# Patient Record
Sex: MALE | Race: WHITE
[De-identification: names, ages, dates, MRNs, and addresses within clinical notes are randomized per-mention and may not be internally consistent; named-entity substitution may affect disease eponyms.]

---

## 2017-12-11 ENCOUNTER — HOSPITAL ENCOUNTER (EMERGENCY)
Dept: HOSPITAL 25 - ED | Age: 32
Discharge: HOME | End: 2017-12-11
Payer: SELF-PAY

## 2017-12-11 VITALS — SYSTOLIC BLOOD PRESSURE: 116 MMHG | DIASTOLIC BLOOD PRESSURE: 71 MMHG

## 2017-12-11 DIAGNOSIS — S06.0X9A: Primary | ICD-10-CM
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
## 2017-12-11 DIAGNOSIS — G61.0: ICD-10-CM
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
## 2017-12-11 DIAGNOSIS — S16.1XXA: ICD-10-CM

## 2017-12-11 DIAGNOSIS — S00.03XA: ICD-10-CM

## 2017-12-11 DIAGNOSIS — F17.200: ICD-10-CM

## 2017-12-11 PROCEDURE — 72125 CT NECK SPINE W/O DYE: CPT

## 2017-12-11 PROCEDURE — 99282 EMERGENCY DEPT VISIT SF MDM: CPT

## 2017-12-11 PROCEDURE — 70450 CT HEAD/BRAIN W/O DYE: CPT

## 2017-12-11 PROCEDURE — 70486 CT MAXILLOFACIAL W/O DYE: CPT
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
## 2018-04-05 ENCOUNTER — HOSPITAL ENCOUNTER (INPATIENT)
Dept: HOSPITAL 25 - AA | Age: 33
LOS: 1 days | Discharge: HOME | DRG: 23 | End: 2018-04-06
Attending: NEUROLOGICAL SURGERY | Admitting: NEUROLOGICAL SURGERY
Payer: COMMERCIAL

## 2018-04-05 DIAGNOSIS — Z88.7: ICD-10-CM

## 2018-04-05 DIAGNOSIS — F17.210: ICD-10-CM

## 2018-04-05 DIAGNOSIS — M50.122: Primary | ICD-10-CM

## 2018-04-05 DIAGNOSIS — M25.78: ICD-10-CM

## 2018-04-05 PROCEDURE — 76001: CPT

## 2018-04-05 PROCEDURE — 0RG10A0 FUSION OF CERVICAL VERTEBRAL JOINT WITH INTERBODY FUSION DEVICE, ANTERIOR APPROACH, ANTERIOR COLUMN, OPEN APPROACH: ICD-10-PCS | Performed by: NEUROLOGICAL SURGERY

## 2018-04-05 PROCEDURE — 72040 X-RAY EXAM NECK SPINE 2-3 VW: CPT

## 2018-04-05 PROCEDURE — 0RB30ZZ EXCISION OF CERVICAL VERTEBRAL DISC, OPEN APPROACH: ICD-10-PCS | Performed by: NEUROLOGICAL SURGERY

## 2018-04-05 RX ADMIN — HYDROCODONE BITARTRATE AND ACETAMINOPHEN PRN TAB: 5; 325 TABLET ORAL at 16:02

## 2018-04-05 RX ADMIN — FAMOTIDINE SCH MG: 20 TABLET, FILM COATED ORAL at 20:17

## 2018-04-05 RX ADMIN — KETOROLAC TROMETHAMINE SCH MG: 30 INJECTION, SOLUTION INTRAMUSCULAR; INTRAVENOUS at 18:55

## 2018-04-05 RX ADMIN — FENTANYL CITRATE PRN MCG: 0.05 INJECTION, SOLUTION INTRAMUSCULAR; INTRAVENOUS at 11:03

## 2018-04-05 RX ADMIN — OXYCODONE HYDROCHLORIDE AND ACETAMINOPHEN PRN TAB: 5; 325 TABLET ORAL at 20:17

## 2018-04-05 RX ADMIN — FENTANYL CITRATE PRN MCG: 0.05 INJECTION, SOLUTION INTRAMUSCULAR; INTRAVENOUS at 10:48

## 2018-04-05 RX ADMIN — NICOTINE SCH: 21 PATCH TRANSDERMAL at 12:07

## 2018-04-05 RX ADMIN — HYDROCODONE BITARTRATE AND ACETAMINOPHEN PRN TAB: 5; 325 TABLET ORAL at 12:06

## 2018-04-05 RX ADMIN — FENTANYL CITRATE PRN MCG: 0.05 INJECTION, SOLUTION INTRAMUSCULAR; INTRAVENOUS at 10:54

## 2018-04-05 NOTE — RAD
INDICATION: Anterior cervical discectomy and fusion.



COMPARISONS: March 06, 2013



TECHNIQUE: Fluoroscopy was provided for a surgical procedure. Total fluoroscopy time is:

19.7 seconds



FINDINGS: Spot images demonstrate anterior cervical fusion at C5-C6.



IMPRESSION: FLUOROSCOPY WAS PROVIDED FOR A SURGICAL PROCEDURE

## 2018-04-06 VITALS — DIASTOLIC BLOOD PRESSURE: 78 MMHG | SYSTOLIC BLOOD PRESSURE: 143 MMHG

## 2018-04-06 RX ADMIN — OXYCODONE HYDROCHLORIDE AND ACETAMINOPHEN PRN TAB: 5; 325 TABLET ORAL at 08:51

## 2018-04-06 RX ADMIN — KETOROLAC TROMETHAMINE SCH MG: 30 INJECTION, SOLUTION INTRAMUSCULAR; INTRAVENOUS at 03:27

## 2018-04-06 RX ADMIN — FAMOTIDINE SCH MG: 20 TABLET, FILM COATED ORAL at 08:51

## 2018-04-06 RX ADMIN — OXYCODONE HYDROCHLORIDE AND ACETAMINOPHEN PRN TAB: 5; 325 TABLET ORAL at 00:17

## 2018-04-06 RX ADMIN — NICOTINE SCH: 21 PATCH TRANSDERMAL at 08:52

## 2018-04-06 NOTE — PN
Progress Note





- Progress Note


Date of Service: 04/06/18


SOAP: 


Subjective:


[]No events ON. Right UE pain resolved. Mild incisional pain. Tolerates PO 

well. Voids, Ambulates, Cleared per PT per nurse. Wants to go home.








Objective:


[]VSS, Afebrile.


MJ collar


Wound s,c,d.


MITCHEL drain removed. Patient tolerated procedure well, Catheter appeared to be 

intact.


AAOx3, KATIA, CNII-XII grossly intact,


Motor 5/5 ,No drift,


Sensory grossly intact to light touch.








Assessment:


[]32 yom POD#! ACDF C5-6








Plan:


[]XR reveals good placement of hardware.


Encourage ambulation.


DC planning.


Full instructions were given to the patient.





KHUSHBOO Vazquez MD Grossly Intact

## 2018-04-06 NOTE — RAD
HISTORY: Postop spinal fusion



COMPARISONS: March 06, 2015



VIEWS: 2, frontal and lateral views of the cervical spine



FINDINGS: 

The cervical spine is visualized from the skull base through C7.



ALIGNMENT:  The alignment is normal.

VERTEBRAL BODIES:  The patient is status post anterior cervical fusion at C5-C6. There is

no hardware failure or osteolysis.

JOINTS:  There is no subluxation or dislocation. The facet joints are unremarkable.    

INTERVERTEBRAL DISCS:  Intervertebral graft material is noted at C5-C6.

SOFT TISSUE: There is prevertebral soft tissue thickening consistent with the history of

recent surgery. A surgical drain is noted.



OTHER:  The skull base is normal. The lung apices are clear.



IMPRESSION: 

STATUS POST ANTERIOR CERVICAL FUSION.

## 2018-04-06 NOTE — OP
DATE OF SURGERY:  04/05/18 - ROOM #332

 

DATE OF BIRTH:  [birth year]

 

SURGEON:  Breanna Vazquez MD.

 

ASSISTANT:  TANA Wellington.  Procedure was done with assistance of the PA 
because of the complexity of the procedure.

 

ANESTHESIA:  General.

 

PRE-OP DIAGNOSIS:  Herniated nucleus pulposus C5-6.

 

POST-OP DIAGNOSIS:  Herniated nucleus pulposus C5-6.

 

PROCEDURE PERFORMED:  Patient underwent anterior cervical diskectomy and fusion 
at C5-6 with PEEK interbody cage, anterior plate and DBX putty with autologous 
local bone graft.

 

ESTIMATED BLOOD LOSS:  25 cc.

 

COMPLICATIONS:  None.

 

SUMMARY:  The patient is a very pleasant 32-year-old gentleman with complaints 
of neck pain radiating to the right upper extremity.  MRI revealed a large disk 
herniation of the right C5-6.  After failing conservative treatment modalities 
he was offered the option of surgical intervention in the form of anterior 
cervical diskectomy and fusion.  After explaining to the patient expectations, 
limitations, and possible complication of the procedure with complications 
included, but not limited to bleeding, infections, risk of injury to adjacent 
structures, coma, paralysis, death, need for additional procedure, anesthesia 
risks, stroke, blindness, cancer, instability, hardware failure, vocal cord 
paralysis, adjacent level disease, pseudoarthrosis, need for additional 
procedures, need for tracheostomy or gastrostomy, anesthesia risks.  Patient 
was agreeable to proceed with surgery.  Informed consent was obtained.  The 
patient understood that his condition may not improve and in fact may get worse 
after the surgery and he may need additional procedure in the future.  He also 
understood that operative plan may be modified according to intraoperative 
findings and conditions.

 

DESCRIPTION OF PROCEDURE:  The patient was brought to the operating room and 
was placed under general anesthesia by the anesthesia team.  He was carefully 
positioned supine on Gavino table and all bony prominences were meticulously 
padded.  A shoulder roll was placed and his head was secured on the donut gel 
head meadows.  His skin was prepped and draped in the standard fashion and after 
appropriate surgical pause and patient indication, a right midline incision 
over the disk space of C5-6 was made with a #10 surgical blade after 
infiltrating the skin with local anesthetic.  Incision was carried down to the 
platysma.  The platysma was gently divided with Metzenbaum scissors and Bovie 
cautery.  The platysma was undermined and the plane between the medial border 
of the sternocleidomastoid and medial structures were gently developed with 
sharp and blunt dissection.  The anterior part of the spine was exposed and 
after confirmation of appropriate surgical level with intraoperative 
fluoroscopic imaging and second surgical pause was performed.  Two [place] pins 
were placed at the C5 and C6 vertebral body while the self-retaining retractors 
were introduced into the field.  A standard diskectomy was performed after 
incising the annulus fibrosus with #10 surgical blade.  The diskectomy was 
carried out with series of pituitary rongeurs, Kerrison punches, high speed 
drill and curettes.  Local bone harvested during the diskectomy and resection 
of the osteophytes were saved for use in the fusion part of the procedure.  The 
posterior ligament was gently divided, a large disk  fragment was identified 
that was in the right of midline as expected from the preoperative imaging.  It 
was gently removed with pituitary rongeurs.  At the end of diskectomy, the dura 
was free of any pressure phenomenon and was pulsating nicely.  After meticulous 
hemostasis was confirmed and copious irrigation and a 8 mm PEEK interbody cage 
was inserted after being filled with locally harvested local bone with DBX 
putty.  The 21 mm Zevo Medtronic plate was placed and four 15 mm titanium 
screws were used to secure the plate in place.  Intraoperative fluoroscopic 
imaging confirmed excellent placement of hole hardware.  The self-retaining 
retractors and [place] pins were removed.  The wound was copiously irrigated and 
meticulous hemostasis confirmed.  The wound was closed by layers after 
inserting #7 MITCHEL drain from a separate stab wound incision.  The subcutaneous 
tissue was approximated with 2-0 sutures and the wound was covered with 
Dermabond. At the end of the procedure, all counts were reported to be correct.
  The patient remained hemodynamically stable throughout the case.  At the end 
of the procedure, the patient was extubated and was transferred to Recovery in 
excellent condition, moving all extremities very well.

 

 [id number]/CPS #: [id number]

KEARA

## 2020-01-22 ENCOUNTER — HOSPITAL ENCOUNTER (EMERGENCY)
Dept: HOSPITAL 25 - ED | Age: 35
Discharge: HOME | End: 2020-01-22
Payer: MEDICAID

## 2020-01-22 VITALS — SYSTOLIC BLOOD PRESSURE: 131 MMHG | DIASTOLIC BLOOD PRESSURE: 73 MMHG

## 2020-01-22 DIAGNOSIS — F17.210: ICD-10-CM

## 2020-01-22 DIAGNOSIS — Z88.7: ICD-10-CM

## 2020-01-22 DIAGNOSIS — B34.9: Primary | ICD-10-CM

## 2020-01-22 DIAGNOSIS — F17.290: ICD-10-CM

## 2020-01-22 LAB
FLUAV RNA SPEC QL NAA+PROBE: NEGATIVE
FLUBV RNA SPEC QL NAA+PROBE: NEGATIVE

## 2020-01-22 PROCEDURE — 87651 STREP A DNA AMP PROBE: CPT

## 2020-01-22 PROCEDURE — 99282 EMERGENCY DEPT VISIT SF MDM: CPT

## 2020-01-22 NOTE — ED
Influenza-Like Illness





- HPI Summary


HPI Summary: 


Pt. is a 34 y.o sore throat, mild cough and myalgias x several days. Pt. notes 

that two family members have strep throat. No past hx. Pt. denies abd. pain, V/D

, rash, neck pain. Sxs are mild in severity. No current modifying factors. 








- History of Current Complaint


Chief Complaint: EDFluSymptoms


Time Seen by Provider: 01/22/20 10:10


Hx Obtained From: Patient





- Allergy/Home Medications


Allergies/Adverse Reactions: 


 Allergies











Allergy/AdvReac Type Severity Reaction Status Date / Time


 


Influenza Virus Vaccines Allergy Severe See Comment Verified 04/05/18 11:47


 


flu vaccine Allergy  See Comment Uncoded 03/29/18 10:53











Home Medications: 


 Home Medications





NK [No Home Medications Reported]  01/22/20 [History Confirmed 01/22/20]











PMH/Surg Hx/FS Hx/Imm Hx


Previously Healthy: Yes


Endocrine/Hematology History: 


   Denies: Hx Anticoagulant Therapy, Hx Blood Disorders, Hx Diabetes


Cardiovascular History: 


   Denies: Hx Hypertension, Hx Pacemaker/ICD


 History: 


   Denies: Hx Renal Disease


Sensory History: 


   Denies: Hx Hearing Aid


Neurological History: Reports: Hx Headaches - reports occas, Other Neuro 

Impairments/Disorders - Guillain [place]


Psychiatric History: 


   Denies: Hx Panic Disorder





- Surgical History


Surgery Procedure, Year, and Place: CERVICAL FUSION WITH PLATE 4/2018, RT 

FOREARM ORIF 2010


Hx Anesthesia Reactions: No


Infectious Disease History: No


Infectious Disease History: Reports: Hx Shingles - dec 2017, right back,  tx 

and resolved


   Denies: Traveled Outside the [place] in Last 30 Days





- Family History


Known Family History: Positive: Cardiac Disease, Hypertension, Non-Contributory





- Social History


Occupation: Unemployed


Lives: With Family


Alcohol Use: Occasionally


Hx Substance Use: No


Substance Use Type: Reports: None


Hx Tobacco Use: Yes


Smoking Status (MU): Current Every Day Smoker


Type: Cigarettes, eCigarettes


Amount Used/How Often: 1/2 ppd for 20 years - also "vaping"





Review of Systems


Positive: Chills


Eyes: Negative


Positive: Sore Throat


Cardiovascular: Negative


Positive: Cough.  Negative: Shortness Of Breath


Gastrointestinal: Negative


Negative: Abdominal Pain, Vomiting, Diarrhea


Positive: Myalgia


Skin: Negative


Negative: Rash


Neurological: Negative


All Other Systems Reviewed And Are Negative: Yes





Physical Exam


Triage Information Reviewed: Yes


Vital Signs On Initial Exam: 


 Initial Vitals











Temp Pulse Resp BP Pulse Ox


 


 97.6 F   82   18   121/72   97 


 


 01/22/20 09:52  01/22/20 09:52  01/22/20 09:52  01/22/20 09:52  01/22/20 09:52











Vital Signs Reviewed: Yes


Appearance: Positive: Well-Appearing - Pt. sitting on bed in NAD.


Skin: Positive: Warm, Dry


Head/Face: Positive: Normal Head/Face Inspection


Eyes: Positive: Normal, EOMI


ENT: Positive: Pharyngeal erythema, TMs normal, Uvula midline.  Negative: 

Tonsillar swelling, Tonsillar exudate, Trismus, Muffled voice, Hoarse voice


Neck: Positive: Supple, Enlarged Nodes @ - left side lymphadenopathy..  Negative

: Nuchal Rigidity


Respiratory/Lung Sounds: Positive: Clear to Auscultation, Breath Sounds 

Present.  Negative: Rales, Rhonchi, Wheezes


Cardiovascular: Positive: Normal, RRR


Neurological: Positive: Normal, CN Intact II-III


Psychiatric: Positive: Affect/Mood Appropriate





Procedures





- Sedation


Patient Received Moderate/Deep Sedation with Procedure: No





Diagnostics





- Vital Signs


 Vital Signs











  Temp Pulse Resp BP Pulse Ox


 


 01/22/20 09:52  97.6 F  82  18  121/72  97














- Laboratory


Lab Statement: Any lab studies that have been ordered have been reviewed, and 

results considered in the medical decision making process.





Flu Symptom Course/Dx





- Course


Course Of Treatment: Pt. with above symptoms. Afebrile. Benign exam. Negative 

strep and flu. Will treat conservatively. Suspect viral etiology. Will f.u with 

PCP. Tylenol or motrin as directed. Will return to er if sxs change or worsen.





- Diagnoses


Differential Diagnosis/HQI/PQRI: Positive: Influenza, Upper Respiratory 

Infection


Provider Diagnoses: 


 Viral syndrome








Discharge ED





- Sign-Out/Discharge


Documenting (check all that apply): Patient Departure





- Discharge Plan


Condition: Good


Disposition: HOME


Patient Education Materials:  Viral Syndrome (ED)


Referrals: 


Haider Zavala NP [Primary Care Provider] - 


Additional Instructions: 


Follow up with your PCP if symptoms persist


increase fluids and rest


Tylenol or Motrin for pain as directed


Return to ER if symptoms change or worsen





- Billing Disposition and Condition


Condition: GOOD


Disposition: Home





- Attestation Statements


Provider Attestation: 





I was available for consult. This patient was seen by the HELEN. The patient was 

not presented to, seen by, or examined by me. Todd Lewis MD